# Patient Record
Sex: MALE | Race: WHITE | NOT HISPANIC OR LATINO | Employment: OTHER | URBAN - METROPOLITAN AREA
[De-identification: names, ages, dates, MRNs, and addresses within clinical notes are randomized per-mention and may not be internally consistent; named-entity substitution may affect disease eponyms.]

---

## 2018-01-04 ENCOUNTER — OFFICE VISIT (OUTPATIENT)
Dept: INTERNAL MEDICINE | Facility: CLINIC | Age: 43
End: 2018-01-04

## 2018-01-04 VITALS
BODY MASS INDEX: 26.51 KG/M2 | TEMPERATURE: 98.2 F | DIASTOLIC BLOOD PRESSURE: 86 MMHG | OXYGEN SATURATION: 98 % | WEIGHT: 179 LBS | HEART RATE: 82 BPM | HEIGHT: 69 IN | SYSTOLIC BLOOD PRESSURE: 122 MMHG

## 2018-01-04 DIAGNOSIS — R10.9 ABDOMINAL CRAMPING: ICD-10-CM

## 2018-01-04 DIAGNOSIS — R11.0 NAUSEA: Primary | ICD-10-CM

## 2018-01-04 LAB
EXPIRATION DATE: NORMAL
FLUAV AG NPH QL: NEGATIVE
FLUBV AG NPH QL: NEGATIVE
INTERNAL CONTROL: NORMAL
Lab: NORMAL

## 2018-01-04 PROCEDURE — 99202 OFFICE O/P NEW SF 15 MIN: CPT | Performed by: NURSE PRACTITIONER

## 2018-01-04 PROCEDURE — 87804 INFLUENZA ASSAY W/OPTIC: CPT | Performed by: NURSE PRACTITIONER

## 2018-01-04 RX ORDER — PROMETHAZINE HYDROCHLORIDE 25 MG/1
25 TABLET ORAL EVERY 8 HOURS PRN
Qty: 15 TABLET | Refills: 0 | Status: SHIPPED | OUTPATIENT
Start: 2018-01-04 | End: 2019-02-09

## 2018-01-04 RX ORDER — ONDANSETRON 4 MG/1
4 TABLET, ORALLY DISINTEGRATING ORAL EVERY 8 HOURS PRN
Qty: 12 TABLET | Refills: 0 | Status: SHIPPED | OUTPATIENT
Start: 2018-01-04 | End: 2019-02-09

## 2018-01-04 NOTE — PROGRESS NOTES
"  Chief Complaint   Patient presents with   • Nausea     sx started today at 1:30pm    • Abdominal Cramping       HPI  Elijah Edmondson is a 42 y.o. male who presents with abdominal pain and cramping, severe nausea, no vomiting. Had 3 BMs but not diarrhea. Started suddenly in afternoon. Went to spin class this morning & was fine.  Wife had stomach virus 2 days ago.      [facility]  The following portions of the patient's history were reviewed and updated as appropriate: allergies, current medications, past family history, past medical history, past social history, past surgical history and problem list.    History reviewed. No pertinent past medical history.  Past Surgical History:   Procedure Laterality Date   • APPENDECTOMY       There are no active problems to display for this patient.    Social History   Substance Use Topics   • Smoking status: Never Smoker   • Smokeless tobacco: Never Used   • Alcohol use No     No current outpatient prescriptions on file prior to visit.     No current facility-administered medications on file prior to visit.          Review of Systems   Constitutional: Positive for chills.   Gastrointestinal: Positive for abdominal pain and nausea. Negative for diarrhea and vomiting.   Musculoskeletal: Positive for myalgias.   Neurological: Positive for headaches.           Objective   Blood pressure 122/86, pulse 82, temperature 98.2 °F (36.8 °C), temperature source Oral, height 175.3 cm (69\"), weight 81.2 kg (179 lb), SpO2 98 %.  Body mass index is 26.43 kg/(m^2).  Temp 99.1    Physical Exam   Constitutional: He is oriented to person, place, and time. He appears well-developed and well-nourished. He appears ill.   HENT:   Right Ear: Tympanic membrane normal.   Left Ear: Tympanic membrane normal.   Mouth/Throat: Oropharynx is clear and moist and mucous membranes are normal. No oropharyngeal exudate, posterior oropharyngeal edema or posterior oropharyngeal erythema.   Eyes: Conjunctivae are normal. "   Neck: Normal range of motion.   Cardiovascular: Normal rate, regular rhythm and normal heart sounds.    Pulmonary/Chest: Effort normal and breath sounds normal.   Abdominal: Soft. He exhibits no distension. Bowel sounds are increased. There is tenderness in the epigastric area. There is no rigidity, no rebound, no guarding and negative Marino's sign.   Neurological: He is alert and oriented to person, place, and time.   Skin: Skin is warm and dry.   Psychiatric: He has a normal mood and affect. His behavior is normal.   Nursing note and vitals reviewed.        Results for orders placed or performed in visit on 01/04/18   POC Influenza A / B   Result Value Ref Range    Rapid Influenza A Ag negative     Rapid Influenza B Ag negative     Internal Control Passed Passed    Lot Number 03/07/2020     Expiration Date 03/07/2020          ASSESSMENT/PLAN  1. Nausea    - promethazine (PHENERGAN) 25 MG tablet; Take 1 tablet by mouth Every 8 (Eight) Hours As Needed for Nausea or Vomiting.  Dispense: 15 tablet; Refill: 0  - ondansetron ODT (ZOFRAN-ODT) 4 MG disintegrating tablet; Take 1 tablet by mouth Every 8 (Eight) Hours As Needed for Nausea or Vomiting.  Dispense: 12 tablet; Refill: 0  - POC Influenza A / B    2. Abdominal cramping    - promethazine (PHENERGAN) 25 MG tablet; Take 1 tablet by mouth Every 8 (Eight) Hours As Needed for Nausea or Vomiting.  Dispense: 15 tablet; Refill: 0  - ondansetron ODT (ZOFRAN-ODT) 4 MG disintegrating tablet; Take 1 tablet by mouth Every 8 (Eight) Hours As Needed for Nausea or Vomiting.  Dispense: 12 tablet; Refill: 0    If abdominal pain becomes more severe, or if unable to keep down fluids needs to go to ER    Plan of care reviewed with patient at the conclusion of today's visit. Education was provided in regards to diagnosis, management and any prescribed or recommended OTC medications.  Patient verbalizes Understanding of and agreement with management plan.          Electronically  signed by:  Barbara Gutierrez, APRN  01/04/2018

## 2019-02-09 ENCOUNTER — OFFICE VISIT (OUTPATIENT)
Dept: INTERNAL MEDICINE | Facility: CLINIC | Age: 44
End: 2019-02-09

## 2019-02-09 VITALS
TEMPERATURE: 98.2 F | OXYGEN SATURATION: 99 % | HEART RATE: 57 BPM | HEIGHT: 69 IN | WEIGHT: 175 LBS | SYSTOLIC BLOOD PRESSURE: 142 MMHG | BODY MASS INDEX: 25.92 KG/M2 | DIASTOLIC BLOOD PRESSURE: 80 MMHG

## 2019-02-09 DIAGNOSIS — M62.838 MUSCLE SPASM: Primary | ICD-10-CM

## 2019-02-09 PROCEDURE — 99213 OFFICE O/P EST LOW 20 MIN: CPT | Performed by: NURSE PRACTITIONER

## 2019-02-09 RX ORDER — CYCLOBENZAPRINE HCL 10 MG
10 TABLET ORAL 3 TIMES DAILY PRN
Qty: 30 TABLET | Refills: 0 | Status: SHIPPED | OUTPATIENT
Start: 2019-02-09 | End: 2019-02-09

## 2019-02-09 RX ORDER — TIZANIDINE 2 MG/1
2 TABLET ORAL EVERY 8 HOURS PRN
Qty: 30 TABLET | Refills: 0 | Status: SHIPPED | OUTPATIENT
Start: 2019-02-09

## 2019-02-09 NOTE — PROGRESS NOTES
"Chief Complaint   Patient presents with   • Spasms     in his stomach area since 12 today       History of Present Illness  43 y.o.male presents for spasm.    Spasms in stomach epigasttric area since about noon today. No nausea, no abdominal pain, some burping uses frequent tums. Started after had been at gym had been lifting weights 120lb and swam 45 mintues. No short of air no chest pain.   Normal ekg in jan with physical.    Review of Systems   Constitutional: Negative for appetite change, chills, fever, unexpected weight gain and unexpected weight loss.   Respiratory: Negative for shortness of breath.    Cardiovascular: Negative for chest pain, palpitations and leg swelling.   Gastrointestinal: Positive for indigestion. Negative for abdominal pain, blood in stool, constipation, diarrhea, nausea and vomiting.   Genitourinary: Negative for difficulty urinating.   Musculoskeletal:        Muscle spasm in abdomen   Skin: Negative for rash.   Neurological: Negative for headache.   Psychiatric/Behavioral: The patient is nervous/anxious.         Anxious over current sx.         [facility]  The following portions of the patient's history were reviewed and updated as appropriate: allergies, current medications, past family history, past medical history, past social history, past surgical history and problem list.     Social hx:  Tobacco never smker  Alcohol  History reviewed. No pertinent past medical history.   Past Surgical History:   Procedure Laterality Date   • APPENDECTOMY        Allergies   Allergen Reactions   • Penicillins       History reviewed. No pertinent family history.         Current Outpatient Medications:   •  DICLOFENAC PO, Take  by mouth., Disp: , Rfl:     VITALS:  /80   Pulse 57   Temp 98.2 °F (36.8 °C)   Ht 175.3 cm (69\")   Wt 79.4 kg (175 lb)   SpO2 99%   BMI 25.84 kg/m²     Physical Exam   Constitutional: He is oriented to person, place, and time. He appears well-developed and " well-nourished. No distress.   HENT:   Head: Normocephalic.   Mouth/Throat: Oropharynx is clear and moist.   Eyes: Pupils are equal, round, and reactive to light.   Neck: Neck supple.   Cardiovascular: Normal rate, regular rhythm, normal heart sounds and intact distal pulses.   Pulmonary/Chest: Effort normal and breath sounds normal. No accessory muscle usage. He exhibits no tenderness and no crepitus.   Right lower chest wall muscle spasm noted; nontender.   Abdominal: Soft. Bowel sounds are normal. He exhibits no distension, no abdominal bruit and no mass. There is no hepatosplenomegaly. There is no tenderness. There is no rebound, no guarding, no tenderness at McBurney's point and negative Marino's sign. No hernia.   No epigastric or any abd pain   Neurological: He is alert and oriented to person, place, and time.   Skin: Skin is warm and dry.       LABS      ASSESSMENT/PLAN  Elijah was seen today for spasms.    Diagnoses and all orders for this visit:    Muscle spasm  -     tiZANidine (ZANAFLEX) 2 MG tablet; Take 1 tablet by mouth Every 8 (Eight) Hours As Needed for Muscle Spasms.    Other orders  -     Discontinue: cyclobenzaprine (FLEXERIL) 10 MG tablet; Take 1 tablet by mouth 3 (Three) Times a Day As Needed for Muscle Spasms.    appears to be a muscle spasm of right lower anterior chest wall just adjacent to epigastric area.  Did a lot of physical activity just before onset; encouraged fluid electrolytes, heat therapy to chest wall and muscle relaxers.  His pharmacy called and he evidently takes tramadol prn so changed to tizanidine.  If worsening of symptoms or no improvement, patient should contact his pcp office for further evaluation treatment or seek urgent care.    I discussed the patients findings and my recommendations with patient.  Patient was encouraged to keep me informed of any acute changes, lack of improvement, or any new concerning symptoms.    Patient voiced understanding of all instructions  and denied further questions.      FOLLOW-UP  Return if symptoms worsen or fail to improve.    Electronically signed by:    JI Sylvester  02/09/2019

## 2019-02-09 NOTE — PATIENT INSTRUCTIONS
Heat Therapy  Heat therapy can help ease sore, stiff, injured, and tight muscles and joints. Heat relaxes your muscles, which may help ease your pain. Heat therapy should only be used on old, pre-existing, or long-lasting (chronic) injuries. Do not use heat therapy unless told by your doctor.  How to use heat therapy  There are several different kinds of heat therapy, including:  · Moist heat pack.  · Warm water bath.  · Hot water bottle.  · Electric heating pad.  · Heated gel pack.  · Heated wrap.  · Electric heating pad.    General heat therapy recommendations  · Do not sleep while using heat therapy. Only use heat therapy while you are awake.  · Your skin may turn pink while using heat therapy. Do not use heat therapy if your skin turns red.  · Do not use heat therapy if you have new pain.  · High heat or long exposure to heat can cause burns. Be careful when using heat therapy to avoid burning your skin.  · Do not use heat therapy on areas of your skin that are already irritated, such as with a rash or sunburn.  Get help if:  · You have blisters, redness, swelling (puffiness), or numbness.  · You have new pain.  · Your pain is worse.  This information is not intended to replace advice given to you by your health care provider. Make sure you discuss any questions you have with your health care provider.  Document Released: 03/11/2013 Document Revised: 05/25/2017 Document Reviewed: 02/10/2015  [employer] Patient Education © 2018 [employer]

## 2025-06-12 ENCOUNTER — HOSPITAL ENCOUNTER (EMERGENCY)
Facility: HOSPITAL | Age: 50
Discharge: HOME | End: 2025-06-12
Attending: EMERGENCY MEDICINE
Payer: COMMERCIAL

## 2025-06-12 ENCOUNTER — APPOINTMENT (OUTPATIENT)
Dept: CT IMAGING | Facility: HOSPITAL | Age: 50
End: 2025-06-12
Payer: COMMERCIAL

## 2025-06-12 VITALS
SYSTOLIC BLOOD PRESSURE: 154 MMHG | WEIGHT: 205 LBS | HEIGHT: 69 IN | BODY MASS INDEX: 30.36 KG/M2 | HEART RATE: 63 BPM | RESPIRATION RATE: 18 BRPM | OXYGEN SATURATION: 97 % | DIASTOLIC BLOOD PRESSURE: 79 MMHG | TEMPERATURE: 98.4 F

## 2025-06-12 DIAGNOSIS — S06.0X0A CONCUSSION WITHOUT LOSS OF CONSCIOUSNESS, INITIAL ENCOUNTER: Primary | ICD-10-CM

## 2025-06-12 DIAGNOSIS — S01.81XA FACIAL LACERATION, INITIAL ENCOUNTER: ICD-10-CM

## 2025-06-12 DIAGNOSIS — S00.83XA CONTUSION OF FACE, INITIAL ENCOUNTER: ICD-10-CM

## 2025-06-12 PROCEDURE — 99284 EMERGENCY DEPT VISIT MOD MDM: CPT

## 2025-06-12 PROCEDURE — 25010000002 TETANUS-DIPHTH-ACELL PERTUSSIS 5-2.5-18.5 LF-MCG/0.5 SUSPENSION PREFILLED SYRINGE: Performed by: EMERGENCY MEDICINE

## 2025-06-12 PROCEDURE — 90471 IMMUNIZATION ADMIN: CPT | Performed by: EMERGENCY MEDICINE

## 2025-06-12 PROCEDURE — 90715 TDAP VACCINE 7 YRS/> IM: CPT | Performed by: EMERGENCY MEDICINE

## 2025-06-12 PROCEDURE — 70486 CT MAXILLOFACIAL W/O DYE: CPT

## 2025-06-12 PROCEDURE — 25010000002 LIDOCAINE 1% - EPINEPHRINE 1:100000 1 %-1:100000 SOLUTION: Performed by: EMERGENCY MEDICINE

## 2025-06-12 RX ORDER — LIDOCAINE HYDROCHLORIDE AND EPINEPHRINE 10; 10 MG/ML; UG/ML
10 INJECTION, SOLUTION INFILTRATION; PERINEURAL ONCE
Status: COMPLETED | OUTPATIENT
Start: 2025-06-12 | End: 2025-06-12

## 2025-06-12 RX ORDER — IBUPROFEN 800 MG/1
800 TABLET, FILM COATED ORAL ONCE
Status: COMPLETED | OUTPATIENT
Start: 2025-06-12 | End: 2025-06-12

## 2025-06-12 RX ADMIN — TETANUS TOXOID, REDUCED DIPHTHERIA TOXOID AND ACELLULAR PERTUSSIS VACCINE, ADSORBED 0.5 ML: 5; 2.5; 8; 8; 2.5 SUSPENSION INTRAMUSCULAR at 15:20

## 2025-06-12 RX ADMIN — IBUPROFEN 800 MG: 800 TABLET, FILM COATED ORAL at 15:19

## 2025-06-12 RX ADMIN — LIDOCAINE HYDROCHLORIDE AND EPINEPHRINE 10 ML: 10; 10 INJECTION, SOLUTION INFILTRATION; PERINEURAL at 19:17

## 2025-06-12 NOTE — ED PROVIDER NOTES
Center    EMERGENCY DEPARTMENT ENCOUNTER      Pt Name: Elijah Edmondson  MRN: [id number]  YOB: 1975  Date of evaluation: 6/12/2025  Provider: Mumtaz Maharaj MD    CHIEF COMPLAINT       Chief Complaint   Patient presents with    Laceration         HISTORY OF PRESENT ILLNESS   Elijah Edmondson is a 49 y.o. male who presents to the emergency department following injury to the left temple.  Patient was playing basketball when he was inadvertently elbowed in the left temple, sustaining laceration.  Patient did not lose consciousness.  He does not take any antiplatelet or anticoagulant medications.  He denies any nausea or vomiting, neck pain, or peripheral paresthesias, weakness, or numbness.  Unsure about last tetanus vaccination.      Nursing notes were reviewed.    REVIEW OF SYSTEMS     ROS:  A chief complaint appropriate review of systems was completed and is negative except as noted in the HPI.      PAST MEDICAL HISTORY   No past medical history on file.      SURGICAL HISTORY       Past Surgical History:   Procedure Laterality Date    APPENDECTOMY           CURRENT MEDICATIONS     No current facility-administered medications for this encounter.    Current Outpatient Medications:     DICLOFENAC PO, Take  by mouth., Disp: , Rfl:     tiZANidine (ZANAFLEX) 2 MG tablet, Take 1 tablet by mouth Every 8 (Eight) Hours As Needed for Muscle Spasms., Disp: 30 tablet, Rfl: 0    ALLERGIES     Penicillins    FAMILY HISTORY     No family history on file.       SOCIAL HISTORY       Social History     Socioeconomic History    Marital status:    Tobacco Use    Smoking status: Never    Smokeless tobacco: Never   Substance and Sexual Activity    Alcohol use: No    Drug use: No    Sexual activity: Defer         PHYSICAL EXAM    (up to 7 for level 4, 8 or more for level 5)     Vitals:    06/12/25 1700 06/12/25 1816 06/12/25 1830 06/12/25 1900   BP: 134/81 154/79     BP Location:       Patient Position:       Pulse: 64 62  79 63   Resp:       Temp:       TempSrc:       SpO2: 97% 97% 99% 97%   Weight:       Height:           General: Awake, alert, no acute distress.  HEENT: Conjunctivae normal.  Neck: Trachea midline.  There is no midline cervical spinal tenderness.  Patient is able to fully range the neck without pain.  Cardiac: Heart regular rate  Lungs: Normal effort  Chest wall: No deformity  Abdomen: Non-distended  Musculoskeletal: No deformity.  Neuro: Alert and oriented x 4.  Patient observed ambulating without difficulty.  Moves all extremities equally.  He has no sensory deficits.  Dermatology: 3 cm V-shaped laceration just lateral to the left eye without involving the lid margin  Psych: Mentation is grossly normal, cognition is grossly normal. Affect is appropriate.        DIAGNOSTIC RESULTS     EKG: All EKGs are interpreted by the Emergency Department Physician who either signs or Co-signs this chart in the absence of a cardiologist.    No orders to display         RADIOLOGY:   [x] Radiologist's Report Reviewed:  CT Maxillofacial Without Contrast   Final Result   Impression:   Left periorbital soft tissue edema without evidence of retro-orbital hematoma.       No acute maxillofacial fracture.            Electronically Signed: Ang Smith MD     6/12/2025 5:46 PM EDT     Workstation ID: [id number]          I ordered and independently reviewed the above noted radiographic studies.        LABS:    I have reviewed and interpreted all of the currently available lab results from this visit (if applicable):  Results for orders placed or performed in visit on 01/04/18   POC Influenza A / B    Collection Time: 01/04/18  7:00 PM    Specimen: Swab   Result Value Ref Range    Rapid Influenza A Ag negative     Rapid Influenza B Ag negative     Internal Control Passed Passed    Lot Number 03/07/2020     Expiration Date 03/07/2020         If labs were ordered, I independently reviewed the results and considered them in treating the  patient.      EMERGENCY DEPARTMENT COURSE and DIFFERENTIAL DIAGNOSIS/MDM:   Vitals:  AS OF 20:25 EDT    BP - 154/79  HR - 63  TEMP - 98.4 °F (36.9 °C) (Oral)  O2 SATS - 97%        Discussion below represents my analysis of pertinent findings related to patient's condition, differential diagnosis, treatment plan and final disposition.      Differential diagnosis:  The differential diagnosis associated with the patient's presentation includes: Concussion, ICH, skull fracture, facial fracture      Independent interpretations (ECG/rhythm strip/X-ray/US/CT scan): I independently interpreted the patient's facial CT and cardiac monitor.  No evidence of facial fracture, patient in sinus rhythm      Additional sources:  Discussed/obtained information from independent historians:   [] Spouse:   [] Parent:   [] Friend:   [x] EMS: Report was taken from EMS, vital signs stable during transport   [] Other:  External (non-ED) record review:   [] Inpatient record:   [] Office record:   [] Outpatient record:   [] Prior Outpatient labs:   [] Prior Outpatient radiology:   [] Primary Care record:   [] Outside ED record:   [] Other:       Care significantly affected by Social Determinants of Health (housing and economic circumstances, unemployment)    [] Yes     [x] No   If yes, Patient's care significantly limited by  Social Determinants of Health including:    [] Inadequate housing    [] Low income    [] Alcoholism and drug addiction in family    [] Problems related to primary support group    [] Unemployment    [] Problems related to employment    [] Other Social Determinants of Health:       ED Course:    ED Course as of 06/15/25 2025   Thu Jun 12, 2025   1438 [place] CT Head Injury/Trauma Rule from [email or web address]  on 6/12/2025  ** All calculations should be rechecked by clinician prior to use **    RESULT SUMMARY:  CT Unnecessary    The [place] CT Head Rule suggests a head CT is not necessary for this patient (sensitivity % for  "all intracranial traumatic findings, sensitivity 100% for findings requiring neurosurgical intervention).      INPUTS:  Age <16 years -> 0 = No  Patient on blood thinners -> 0 = No  Seizure after injury -> 0 =  No  GCS <15 at 2 hours post-injury -> 0 = No  Suspected open or depressed skull fracture -> 0 = No  Any sign of basilar skull fracture? -> 0 = No  >=2 episodes of vomiting -> 0 = No  Age >=65 years -> 0 = No  Retrograde amnesia to the event >= 30 minutes -> 0 = No  \"Dangerous\" mechanism? -> 0 = No   [NS]   1438 [place] C-Spine Rule from [employer]  on 6/12/2025  ** All calculations should be rechecked by clinician prior to use **    RESULT SUMMARY:  Low risk  C-spine can be cleared clinically by these criteria. No imaging is required.      INPUTS:  Age >=65 years, extremity paresthesias, or dangerous mechanism -> 1 = No  Low risk factor present -> 2 = Yes  Able to actively rotate neck 45° left and right -> 2 = Yes   [NS]   1905 On reexamination, patient remains well-appearing and nontoxic.  Resting comfortably in bed.  No additional complaints at this time.  Maxillofacial CT reveals no evidence of facial bone fracture or retrobulbar hematoma.  I considered a head CT and C-spine CT, however patient has no high risk features or concerning findings on physical examination, negative by [place]o head CT and cervical spine rules and therefore feel that he is clinically ruled out for any clinically significant intracranial or cervical spine injury.  Patient tetanus updated in the ED, had a left facial laceration that was repaired by myself. [NS]      ED Course User Index  [NS] Mumtaz Maharaj MD         I had a discussion with the patient/family regarding diagnosis, diagnostic results, treatment plan, and medications.  The patient/family indicated understanding of these instructions.  I spent adequate time at the bedside preceding discharge necessary to personally discuss the aftercare instructions, giving " patient education, providing explanations of the results of our evaluations/findings, and my decision making to assure that the patient/family understand the plan of care.  Time was allotted to answer questions at that time and throughout the ED course.  Emphasis was placed on timely follow-up after discharge.  I also discussed the potential for the development of an acute emergent condition requiring further evaluation, admission, or even surgical intervention. I discussed that we found nothing during the visit today indicating the need for further workup, admission, or the presence of an unstable medical condition.  I encouraged the patient to return to the emergency department immediately for ANY concerns, worsening, new complaints, or if symptoms persist and unable to seek follow-up in a timely fashion.  The patient/family expressed understanding and agreement with this plan.  The patient will follow-up with their PCP in 1-2 days for reevaluation.           PROCEDURES:  Laceration Repair  Indication: Left temporal laceration  Consent: Verbal from patient  Technique: A 3 cm laceration is present on the patient's left temple with the following features: V-shaped, superficial, no foreign body, minimal active bleeding, no deep structure involvement. There is no evidence of significant vascular, neurologic, or soft tissue compromise. Full functional status is maintained. The laceration was copiously irrigated with sterile saline. The wound was carefully inspected and probed to the base and found to have no deep structure involvement or foreign body. The wound was anesthetized with approximately 4 mL of lidocaine 1% with epinephrine. The wound was repaired using a total of six 6-0 Prolene sutures in the following technique(s): Simple interrupted.  Complications: None        FINAL IMPRESSION      1. Concussion without loss of consciousness, initial encounter    2. Contusion of face, initial encounter    3. Facial  laceration, initial encounter          DISPOSITION/PLAN     ED Disposition       ED Disposition   Discharge    Condition   Stable    Comment   --                 Comment: Please note this report has been produced using speech recognition software.      Mumtaz Maharaj MD  Attending Emergency Physician             Mumtaz Maharaj MD  06/15/25 2025

## 2025-06-20 ENCOUNTER — HOSPITAL ENCOUNTER (EMERGENCY)
Facility: HOSPITAL | Age: 50
Discharge: LEFT BEFORE DISCHARGE | End: 2025-06-20
Payer: COMMERCIAL

## 2025-06-20 PROCEDURE — 99211 OFF/OP EST MAY X REQ PHY/QHP: CPT

## 2025-08-11 ENCOUNTER — PATIENT ROUNDING (BHMG ONLY) (OUTPATIENT)
Dept: URGENT CARE | Facility: CLINIC | Age: 50
End: 2025-08-11
Payer: COMMERCIAL